# Patient Record
Sex: MALE | Race: BLACK OR AFRICAN AMERICAN | URBAN - METROPOLITAN AREA
[De-identification: names, ages, dates, MRNs, and addresses within clinical notes are randomized per-mention and may not be internally consistent; named-entity substitution may affect disease eponyms.]

---

## 2022-04-21 ENCOUNTER — TELEPHONE ENCOUNTER (OUTPATIENT)
Dept: URBAN - METROPOLITAN AREA CLINIC 23 | Facility: CLINIC | Age: 52
End: 2022-04-21

## 2022-06-13 ENCOUNTER — OFFICE VISIT (OUTPATIENT)
Dept: URBAN - METROPOLITAN AREA CLINIC 78 | Facility: CLINIC | Age: 52
End: 2022-06-13
Payer: COMMERCIAL

## 2022-06-13 ENCOUNTER — DASHBOARD ENCOUNTERS (OUTPATIENT)
Age: 52
End: 2022-06-13

## 2022-06-13 ENCOUNTER — OFFICE VISIT (OUTPATIENT)
Dept: URBAN - METROPOLITAN AREA CLINIC 78 | Facility: CLINIC | Age: 52
End: 2022-06-13

## 2022-06-13 VITALS
TEMPERATURE: 98.4 F | DIASTOLIC BLOOD PRESSURE: 91 MMHG | HEIGHT: 73 IN | SYSTOLIC BLOOD PRESSURE: 154 MMHG | HEART RATE: 55 BPM | WEIGHT: 277.6 LBS | BODY MASS INDEX: 36.79 KG/M2

## 2022-06-13 DIAGNOSIS — Z86.010 PERSONAL HISTORY OF COLONIC POLYPS: ICD-10-CM

## 2022-06-13 PROCEDURE — 99242 OFF/OP CONSLTJ NEW/EST SF 20: CPT | Performed by: INTERNAL MEDICINE

## 2022-06-13 PROCEDURE — 99203 OFFICE O/P NEW LOW 30 MIN: CPT | Performed by: INTERNAL MEDICINE

## 2022-06-13 RX ORDER — SODIUM, POTASSIUM,MAG SULFATES 17.5-3.13G
354 ML SOLUTION, RECONSTITUTED, ORAL ORAL
Qty: 1 | Refills: 0 | OUTPATIENT
Start: 2022-06-13 | End: 2022-06-14

## 2022-06-13 NOTE — HPI-TODAY'S VISIT:
The patient was referred to us by Dr. Danitza Leonard for a surveillance colonoscopy. A copy of this note will be sent to the referring physician.   The patient last had a colonoscopy in 2019. There is no FH of colon cancer or colon polyps.   There is no recent history of rectal bleeding. The patient has no pertinent additional complaints of abdominal pain, constipation, diarrhea, bloating, rectal pain, anorexia or unintentional weight loss.   Regarding any upper GI complaints, the patient has not had heartburn, nausea, vomiting or dysphagia.   The patient does not take blood thinners.  They deny any CP or CROW.  Summary of prior workup: -Labs on 3/31/2022: WBC 5.8, hemoglobin 16.7, MCV 91, platelets 143.  Glucose 103, BUN 17, creatinine 1.1, sodium 141, potassium 4.8, calcium 9.9, total protein 6.8, albumin 4.7, total bilirubin 1.0, alkaline phosphatase 58, AST 14, ALT 34.  Total cholesterol 188, triglycerides 128, hemoglobin A1c 5.7%.  TSH 0.5, free T4 1.4.  - Colonoscopy by Dr. 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  The quality of the prep was fair.  A repeat colonoscopy was advised in 1 year.

## 2022-06-28 PROBLEM — 428283002: Status: ACTIVE | Noted: 2022-06-13

## 2022-08-02 ENCOUNTER — TELEPHONE ENCOUNTER (OUTPATIENT)
Dept: URBAN - METROPOLITAN AREA CLINIC 118 | Facility: CLINIC | Age: 52
End: 2022-08-02

## 2022-08-03 ENCOUNTER — CLAIMS CREATED FROM THE CLAIM WINDOW (OUTPATIENT)
Dept: URBAN - METROPOLITAN AREA CLINIC 4 | Facility: CLINIC | Age: 52
End: 2022-08-03
Payer: COMMERCIAL

## 2022-08-03 ENCOUNTER — OFFICE VISIT (OUTPATIENT)
Dept: URBAN - METROPOLITAN AREA SURGERY CENTER 15 | Facility: SURGERY CENTER | Age: 52
End: 2022-08-03
Payer: COMMERCIAL

## 2022-08-03 DIAGNOSIS — K63.5 BENIGN COLON POLYP: ICD-10-CM

## 2022-08-03 DIAGNOSIS — K63.89 OTHER SPECIFIED DISEASES OF INTESTINE: ICD-10-CM

## 2022-08-03 DIAGNOSIS — Z86.010 ADENOMAS PERSONAL HISTORY OF COLONIC POLYPS: ICD-10-CM

## 2022-08-03 PROCEDURE — 88305 TISSUE EXAM BY PATHOLOGIST: CPT | Performed by: PATHOLOGY

## 2022-08-03 PROCEDURE — 45385 COLONOSCOPY W/LESION REMOVAL: CPT | Performed by: INTERNAL MEDICINE

## 2022-08-03 PROCEDURE — G8907 PT DOC NO EVENTS ON DISCHARG: HCPCS | Performed by: INTERNAL MEDICINE
